# Patient Record
Sex: FEMALE | Race: ASIAN | NOT HISPANIC OR LATINO
[De-identification: names, ages, dates, MRNs, and addresses within clinical notes are randomized per-mention and may not be internally consistent; named-entity substitution may affect disease eponyms.]

---

## 2017-03-22 ENCOUNTER — APPOINTMENT (OUTPATIENT)
Dept: UROGYNECOLOGY | Facility: CLINIC | Age: 72
End: 2017-03-22

## 2017-06-21 ENCOUNTER — APPOINTMENT (OUTPATIENT)
Dept: UROGYNECOLOGY | Facility: CLINIC | Age: 72
End: 2017-06-21

## 2017-09-27 ENCOUNTER — APPOINTMENT (OUTPATIENT)
Dept: UROGYNECOLOGY | Facility: CLINIC | Age: 72
End: 2017-09-27
Payer: MEDICARE

## 2017-09-27 PROCEDURE — 99213 OFFICE O/P EST LOW 20 MIN: CPT

## 2017-10-24 ENCOUNTER — APPOINTMENT (OUTPATIENT)
Dept: RADIOLOGY | Facility: CLINIC | Age: 72
End: 2017-10-24
Payer: MEDICARE

## 2017-10-24 ENCOUNTER — OUTPATIENT (OUTPATIENT)
Dept: OUTPATIENT SERVICES | Facility: HOSPITAL | Age: 72
LOS: 1 days | End: 2017-10-24
Payer: MEDICARE

## 2017-10-24 DIAGNOSIS — Z00.8 ENCOUNTER FOR OTHER GENERAL EXAMINATION: ICD-10-CM

## 2017-10-24 PROCEDURE — 73562 X-RAY EXAM OF KNEE 3: CPT | Mod: 26,50

## 2017-10-24 PROCEDURE — 73562 X-RAY EXAM OF KNEE 3: CPT

## 2017-12-20 ENCOUNTER — APPOINTMENT (OUTPATIENT)
Dept: UROGYNECOLOGY | Facility: CLINIC | Age: 72
End: 2017-12-20
Payer: MEDICARE

## 2017-12-20 PROCEDURE — 99213 OFFICE O/P EST LOW 20 MIN: CPT

## 2018-03-01 ENCOUNTER — OUTPATIENT (OUTPATIENT)
Dept: OUTPATIENT SERVICES | Facility: HOSPITAL | Age: 73
LOS: 1 days | End: 2018-03-01
Payer: MEDICAID

## 2018-03-01 PROCEDURE — G9001: CPT

## 2018-03-07 DIAGNOSIS — R69 ILLNESS, UNSPECIFIED: ICD-10-CM

## 2018-03-21 ENCOUNTER — APPOINTMENT (OUTPATIENT)
Dept: UROGYNECOLOGY | Facility: CLINIC | Age: 73
End: 2018-03-21
Payer: MEDICARE

## 2018-03-21 PROCEDURE — 99213 OFFICE O/P EST LOW 20 MIN: CPT

## 2018-06-20 ENCOUNTER — APPOINTMENT (OUTPATIENT)
Dept: UROGYNECOLOGY | Facility: CLINIC | Age: 73
End: 2018-06-20
Payer: MEDICARE

## 2018-06-20 PROCEDURE — 99213 OFFICE O/P EST LOW 20 MIN: CPT

## 2018-09-17 ENCOUNTER — APPOINTMENT (OUTPATIENT)
Dept: UROGYNECOLOGY | Facility: CLINIC | Age: 73
End: 2018-09-17
Payer: MEDICARE

## 2018-09-17 PROCEDURE — 99213 OFFICE O/P EST LOW 20 MIN: CPT

## 2018-12-17 ENCOUNTER — APPOINTMENT (OUTPATIENT)
Dept: UROGYNECOLOGY | Facility: CLINIC | Age: 73
End: 2018-12-17
Payer: MEDICARE

## 2018-12-17 PROCEDURE — 99213 OFFICE O/P EST LOW 20 MIN: CPT

## 2018-12-17 PROCEDURE — A4562: CPT

## 2019-04-01 ENCOUNTER — APPOINTMENT (OUTPATIENT)
Dept: UROGYNECOLOGY | Facility: CLINIC | Age: 74
End: 2019-04-01
Payer: MEDICARE

## 2019-04-01 PROCEDURE — 99213 OFFICE O/P EST LOW 20 MIN: CPT

## 2019-04-01 NOTE — HISTORY OF PRESENT ILLNESS
[FreeTextEntry1] : Pt presents to office for f/u of prolapse.  Denies any issues with pessary.  Pessary size decreased at last visit.

## 2019-04-01 NOTE — PROCEDURE
[Good Fit] : fits well [Pessary Washed] : washed [H2O] : H2O [None] : no bleeding [Refit] : refit is not needed [Erosion] : no evidence of erosion [Erythema] : no erythema [Discharge] : no vaginal discharge [FreeTextEntry1] : GH  2 1/4 SS [FreeTextEntry8] : routine [facility]

## 2019-04-01 NOTE — DISCUSSION/SUMMARY
[FreeTextEntry1] : Pt doing well with pessary. She will RTO in 3 months or sooner if needed. Pt agrees to call office with any problems/concerns.

## 2019-07-03 ENCOUNTER — APPOINTMENT (OUTPATIENT)
Dept: UROGYNECOLOGY | Facility: CLINIC | Age: 74
End: 2019-07-03

## 2019-08-14 ENCOUNTER — APPOINTMENT (OUTPATIENT)
Dept: UROGYNECOLOGY | Facility: CLINIC | Age: 74
End: 2019-08-14
Payer: MEDICARE

## 2019-08-14 PROCEDURE — A4562: CPT

## 2019-08-14 PROCEDURE — 99214 OFFICE O/P EST MOD 30 MIN: CPT

## 2019-08-14 NOTE — PHYSICAL EXAM
[No Acute Distress] : in no acute distress [Well developed] : well developed [Good Hygeine] : demonstrates good hygeine [Well Nourished] : ~L well nourished [Oriented x3] : oriented to person, place, and time [Normal Memory] : ~T memory was ~L unimpaired [Normal Mood/Affect] : mood and affect are normal [Warm and Dry] : was warm and dry to touch [Normal Gait] : gait was normal [Labia Majora] : were normal [Labia Minora] : were normal [Normal] : was normal [Normal Appearance] : general appearance was normal [Atrophy] : atrophy [No Bleeding] : there was no active vaginal bleeding [Anxiety] : patient is not anxious [Tenderness] : ~T no ~M abdominal tenderness observed [Distended] : not distended

## 2019-08-14 NOTE — HISTORY OF PRESENT ILLNESS
[FreeTextEntry1] : Pt presents to office for fu of prolapse.  She reports that pessary fell out 1 week ago.  Smaller GH pessary was inserted 12/2018.  She denied any issues with pessary until last week.  \par \par Pt reports her  passed away 2 months ago and was not able to RTO last month for f/u.

## 2019-08-14 NOTE — PROCEDURE
[Pessary Inserted] : inserted [None] : no bleeding [Erosion] : no evidence of erosion [Erythema] : no erythema [Discharge] : no vaginal discharge [FreeTextEntry1] : refitted with RS #3 [de-identified] : GH 2 1/2 too big and not able to insert [FreeTextEntry8] : routine [facility]

## 2019-08-14 NOTE — DISCUSSION/SUMMARY
[FreeTextEntry1] : Pt reports comfort with ring with support pessary.  Advised of possibility that pessary can fall out at home.  pt will RTO in 2 weeks for f/u of prolapse.  She agrees to call office with any problems/concerns.

## 2019-08-28 ENCOUNTER — APPOINTMENT (OUTPATIENT)
Dept: UROGYNECOLOGY | Facility: CLINIC | Age: 74
End: 2019-08-28
Payer: MEDICARE

## 2019-08-28 PROCEDURE — 99214 OFFICE O/P EST MOD 30 MIN: CPT

## 2019-08-28 NOTE — PHYSICAL EXAM
[No Acute Distress] : in no acute distress [Well Nourished] : ~L well nourished [Well developed] : well developed [Oriented x3] : oriented to person, place, and time [Good Hygeine] : demonstrates good hygeine [Normal Memory] : ~T memory was ~L unimpaired [Normal Mood/Affect] : mood and affect are normal [Normal Gait] : gait was normal [Warm and Dry] : was warm and dry to touch [Labia Majora] : were normal [Labia Minora] : were normal [Normal] : was normal [Normal Appearance] : general appearance was normal [Atrophy] : atrophy [No Bleeding] : there was no active vaginal bleeding [Anxiety] : patient is not anxious [Tenderness] : ~T no ~M abdominal tenderness observed [Distended] : not distended

## 2019-08-28 NOTE — HISTORY OF PRESENT ILLNESS
[FreeTextEntry1] : Pt presents to office for f/u of prolapse. She was refitted with RS #3 at last visit.  She reports that pessary fell out 3 days later.  Prior to this she was wearing a GH pessary for several years, which fell out.

## 2019-08-28 NOTE — PROCEDURE
[Refit] : refit needed [Pessary Inserted] : inserted [None] : no bleeding [H2O] : H2O [Erosion] : no evidence of erosion [Erythema] : no erythema [FreeTextEntry1] : fitted with Cube #2 [de-identified] : tried RS #4-pt reported discomfort, too tight. she reports comfort with #2 cube [FreeTextEntry8] : routine [facility]

## 2019-08-28 NOTE — DISCUSSION/SUMMARY
[FreeTextEntry1] : Pt reports comfort with cube #2.  she will RTO in 2 weeks or sooner if needed. Pt agrees to call office with any problems/concerns.

## 2019-09-11 ENCOUNTER — APPOINTMENT (OUTPATIENT)
Dept: UROGYNECOLOGY | Facility: CLINIC | Age: 74
End: 2019-09-11
Payer: MEDICARE

## 2019-09-11 PROCEDURE — 99213 OFFICE O/P EST LOW 20 MIN: CPT

## 2019-09-11 NOTE — DISCUSSION/SUMMARY
[FreeTextEntry1] : Pt doing well with pessary. She will RTO in 2 months for f/u of prolapse. Agrees to call office with any problems/concerns.

## 2019-09-11 NOTE — PROCEDURE
[Good Fit] : fits well [H2O] : H2O [Pessary Washed] : washed [None] : no bleeding [Refit] : refit is not needed [Erosion] : no evidence of erosion [Discharge] : no vaginal discharge [Erythema] : no erythema [Infection] : no evidence of infection [FreeTextEntry1] : Cube #2 [FreeTextEntry8] : routine [facility]

## 2019-09-11 NOTE — PHYSICAL EXAM
[No Acute Distress] : in no acute distress [Well developed] : well developed [Well Nourished] : ~L well nourished [Oriented x3] : oriented to person, place, and time [Good Hygeine] : demonstrates good hygeine [Normal Memory] : ~T memory was ~L unimpaired [Normal Mood/Affect] : mood and affect are normal [Warm and Dry] : was warm and dry to touch [Normal Gait] : gait was normal [Labia Majora] : were normal [Labia Minora] : were normal [Normal] : was normal [Normal Appearance] : general appearance was normal [Atrophy] : atrophy [No Bleeding] : there was no active vaginal bleeding [Anxiety] : patient is not anxious [Tenderness] : ~T no ~M abdominal tenderness observed [Distended] : not distended

## 2019-09-11 NOTE — HISTORY OF PRESENT ILLNESS
[FreeTextEntry1] : Pt presents to office for f/u of prolapse. Fitted with cube pessary 2 weeks ago.  She reports good fit and support.

## 2019-11-13 ENCOUNTER — APPOINTMENT (OUTPATIENT)
Dept: UROGYNECOLOGY | Facility: CLINIC | Age: 74
End: 2019-11-13
Payer: MEDICARE

## 2019-11-13 PROCEDURE — 99213 OFFICE O/P EST LOW 20 MIN: CPT

## 2019-11-13 NOTE — HISTORY OF PRESENT ILLNESS
[FreeTextEntry1] : Pt presents to office for f/u of prolapse. denies any issues with pessary. reports occasional UUI, not bothersome.

## 2019-11-13 NOTE — PHYSICAL EXAM
[No Acute Distress] : in no acute distress [Well developed] : well developed [Well Nourished] : ~L well nourished [Good Hygeine] : demonstrates good hygeine [Oriented x3] : oriented to person, place, and time [Normal Memory] : ~T memory was ~L unimpaired [Normal Mood/Affect] : mood and affect are normal [Warm and Dry] : was warm and dry to touch [Normal Gait] : gait was normal [Labia Majora] : were normal [Labia Minora] : were normal [Normal Appearance] : general appearance was normal [Atrophy] : atrophy [Normal] : was normal [No Bleeding] : there was no active vaginal bleeding [Anxiety] : patient is not anxious [Tenderness] : ~T no ~M abdominal tenderness observed [Distended] : not distended

## 2019-11-13 NOTE — PROCEDURE
[Good Fit] : fits well [Pessary Washed] : washed [H2O] : H2O [None] : no bleeding [Refit] : refit is not needed [Erosion] : no evidence of erosion [Erythema] : no erythema [Infection] : no evidence of infection [Discharge] : no vaginal discharge [FreeTextEntry8] : routine [facility] [FreeTextEntry1] : Cube #2

## 2020-01-14 ENCOUNTER — APPOINTMENT (OUTPATIENT)
Dept: UROGYNECOLOGY | Facility: CLINIC | Age: 75
End: 2020-01-14
Payer: MEDICARE

## 2020-01-14 PROCEDURE — 99213 OFFICE O/P EST LOW 20 MIN: CPT

## 2020-01-15 NOTE — PROCEDURE
[Good Fit] : fits well [Pessary Washed] : washed [None] : no bleeding [H2O] : H2O [Refit] : refit is not needed [Erosion] : no evidence of erosion [Discharge] : no vaginal discharge [Erythema] : no erythema [Infection] : no evidence of infection [FreeTextEntry1] : Cube #2 [FreeTextEntry8] : routine [facility]

## 2020-03-17 ENCOUNTER — APPOINTMENT (OUTPATIENT)
Dept: UROGYNECOLOGY | Facility: CLINIC | Age: 75
End: 2020-03-17

## 2021-05-27 ENCOUNTER — APPOINTMENT (OUTPATIENT)
Dept: UROGYNECOLOGY | Facility: CLINIC | Age: 76
End: 2021-05-27
Payer: MEDICARE

## 2021-05-27 VITALS
HEART RATE: 70 BPM | BODY MASS INDEX: 29.3 KG/M2 | DIASTOLIC BLOOD PRESSURE: 84 MMHG | SYSTOLIC BLOOD PRESSURE: 123 MMHG | OXYGEN SATURATION: 98 % | HEIGHT: 63 IN | WEIGHT: 165.34 LBS | TEMPERATURE: 97.6 F

## 2021-05-27 PROCEDURE — 99213 OFFICE O/P EST LOW 20 MIN: CPT

## 2021-05-27 NOTE — HISTORY OF PRESENT ILLNESS
[FreeTextEntry1] : Vandana, 74y/o presents with family member for follow up on prolapse.  Pt haven't been back to the office since 1/14/2020.  Pt had a cube # 2 in the past and have been coming for maintenance up until Covid-19 restrictions.  Pt's pessary fell out after a few days after the last visit.  Denies any pain, discomfort, or vaginal bleeding.  She has been urinating and moving BM without problems.  Only complaint is the prolapse is protruding and she would like to have pessary inserted.

## 2021-05-27 NOTE — DISCUSSION/SUMMARY
[FreeTextEntry1] : Fitted with Cube # 3 today.\par She will RTO in 2-3 weeks for pessary check/prolapse. Agrees to call office with any problems/concerns.

## 2021-05-27 NOTE — PHYSICAL EXAM
[No Acute Distress] : in no acute distress [Well developed] : well developed [Well Nourished] : ~L well nourished [Good Hygeine] : demonstrates good hygeine [Oriented x3] : oriented to person, place, and time [Normal Memory] : ~T memory was ~L unimpaired [Normal Mood/Affect] : mood and affect are normal [Anxiety] : patient is not anxious [Tenderness] : ~T no ~M abdominal tenderness observed [Distended] : not distended [Warm and Dry] : was warm and dry to touch [Normal Gait] : gait was normal [Labia Minora] : were normal [Labia Majora] : were normal [Normal] : was normal [Normal Appearance] : general appearance was normal [Atrophy] : atrophy [No Bleeding] : there was no active vaginal bleeding [de-identified] : Prolapse protruding out of vagina

## 2021-05-27 NOTE — PROCEDURE
[Good Fit] : fits well [Refit] : refit is not needed [Erosion] : no evidence of erosion [Erythema] : no erythema [Discharge] : no vaginal discharge [Infection] : no evidence of infection [Pessary Inserted] : inserted [H2O] : H2O [None] : no bleeding [FreeTextEntry1] : Fitted with Cube # 3 [FreeTextEntry8] : routine [facility]

## 2021-06-17 ENCOUNTER — APPOINTMENT (OUTPATIENT)
Dept: UROGYNECOLOGY | Facility: CLINIC | Age: 76
End: 2021-06-17
Payer: MEDICARE

## 2021-06-17 PROCEDURE — 99213 OFFICE O/P EST LOW 20 MIN: CPT

## 2021-06-17 NOTE — HISTORY OF PRESENT ILLNESS
[FreeTextEntry1] : Vandana, 74y/o presents with family member for follow up on prolapse. Was fitted with Cube # 3 and feels pessary is very supportive.  Denies any pain, discomfort, or vaginal bleeding.  She has been urinating and moving BM without problems.

## 2021-06-17 NOTE — DISCUSSION/SUMMARY
[FreeTextEntry1] : Pelvic Prolapse;\par -Continue with Cube # 3.\par -Precaution and instructions were reviewed.\par \par Vaginal atrophy:\par -May use vagina lubrication 2-3x a week.\par \par She will RTO in 2 months for pessary check/prolapse. Agrees to call office with any problems/concerns.

## 2021-06-17 NOTE — PHYSICAL EXAM
[No Acute Distress] : in no acute distress [Well developed] : well developed [Well Nourished] : ~L well nourished [Good Hygeine] : demonstrates good hygeine [Oriented x3] : oriented to person, place, and time [Normal Memory] : ~T memory was ~L unimpaired [Normal Mood/Affect] : mood and affect are normal [Anxiety] : patient is not anxious [Tenderness] : ~T no ~M abdominal tenderness observed [Distended] : not distended [Warm and Dry] : was warm and dry to touch [Normal Gait] : gait was normal [Labia Majora] : were normal [Labia Minora] : were normal [Normal] : was normal [Normal Appearance] : general appearance was normal [Atrophy] : atrophy [No Bleeding] : there was no active vaginal bleeding [de-identified] : Prolapse protruding out of vagina

## 2021-08-19 ENCOUNTER — APPOINTMENT (OUTPATIENT)
Dept: UROGYNECOLOGY | Facility: CLINIC | Age: 76
End: 2021-08-19
Payer: MEDICARE

## 2021-08-19 VITALS — HEART RATE: 100 BPM | SYSTOLIC BLOOD PRESSURE: 135 MMHG | TEMPERATURE: 95.7 F | DIASTOLIC BLOOD PRESSURE: 70 MMHG

## 2021-08-19 DIAGNOSIS — N39.41 URGE INCONTINENCE: ICD-10-CM

## 2021-08-19 PROCEDURE — 99213 OFFICE O/P EST LOW 20 MIN: CPT

## 2021-08-19 NOTE — HISTORY OF PRESENT ILLNESS
[FreeTextEntry1] : Vandana, 74y/o presents with family member for follow up on prolapse. Supported with Cube # 3 and feels pessary is very supportive.  Denies any pain, discomfort, or vaginal bleeding.  She has been urinating and moving BM without problems.

## 2021-08-19 NOTE — PROCEDURE
[Good Fit] : fits well [Refit] : refit is not needed [Erosion] : no evidence of erosion [Erythema] : no erythema [Discharge] : no vaginal discharge [Infection] : no evidence of infection [Pessary Inserted] : inserted [Pessary Washed] : washed [H2O] : H2O [None] : no bleeding [Medication Review] : Medicaiton Review: Patient verbalizes understanding of risks and benefits [FreeTextEntry1] : Cube # 3 [FreeTextEntry3] : vaginal lubrication. [FreeTextEntry8] : routine [facility]

## 2021-08-19 NOTE — PHYSICAL EXAM
[No Acute Distress] : in no acute distress [Well developed] : well developed [Well Nourished] : ~L well nourished [Good Hygeine] : demonstrates good hygeine [Oriented x3] : oriented to person, place, and time [Normal Memory] : ~T memory was ~L unimpaired [Normal Mood/Affect] : mood and affect are normal [Anxiety] : patient is not anxious [Tenderness] : ~T no ~M abdominal tenderness observed [Distended] : not distended [Warm and Dry] : was warm and dry to touch [Normal Gait] : gait was normal [Vulvar Atrophy] : vulvar atrophy [Labia Majora] : were normal [Labia Minora] : were normal [Normal] : was normal [Normal Appearance] : general appearance was normal [Atrophy] : atrophy [Cystocele] : a cystocele [No Bleeding] : there was no active vaginal bleeding [de-identified] : Mild irritation along left vaginal wall rubbing from pessary.  No active bleeding.

## 2021-10-28 ENCOUNTER — APPOINTMENT (OUTPATIENT)
Dept: UROGYNECOLOGY | Facility: CLINIC | Age: 76
End: 2021-10-28
Payer: MEDICARE

## 2021-10-28 PROCEDURE — 99213 OFFICE O/P EST LOW 20 MIN: CPT

## 2021-10-28 NOTE — HISTORY OF PRESENT ILLNESS
[FreeTextEntry1] : Vandana, 76y/o presents with family member for follow up on prolapse. Supported with Cube # 3 and feels pessary is very supportive.  Denies any pain, discomfort, or vaginal bleeding.  She has been urinating and moving BM without problems.

## 2021-10-28 NOTE — PHYSICAL EXAM
[No Acute Distress] : in no acute distress [Well developed] : well developed [Well Nourished] : ~L well nourished [Good Hygeine] : demonstrates good hygeine [Oriented x3] : oriented to person, place, and time [Normal Memory] : ~T memory was ~L unimpaired [Normal Mood/Affect] : mood and affect are normal [Anxiety] : patient is not anxious [Tenderness] : ~T no ~M abdominal tenderness observed [Distended] : not distended [Warm and Dry] : was warm and dry to touch [Normal Gait] : gait was normal [Vulvar Atrophy] : vulvar atrophy [Labia Majora] : were normal [Labia Minora] : were normal [Normal] : was normal [Normal Appearance] : general appearance was normal [Atrophy] : atrophy [Cystocele] : a cystocele [No Bleeding] : there was no active vaginal bleeding [de-identified] : Mild irritation along left vaginal wall rubbing from pessary.  No active bleeding.

## 2022-02-15 ENCOUNTER — APPOINTMENT (OUTPATIENT)
Dept: UROGYNECOLOGY | Facility: CLINIC | Age: 77
End: 2022-02-15
Payer: MEDICARE

## 2022-02-15 PROCEDURE — 99213 OFFICE O/P EST LOW 20 MIN: CPT

## 2022-02-18 NOTE — PHYSICAL EXAM
[No Acute Distress] : in no acute distress [Well developed] : well developed [Well Nourished] : ~L well nourished [Good Hygeine] : demonstrates good hygeine [Oriented x3] : oriented to person, place, and time [Normal Memory] : ~T memory was ~L unimpaired [Normal Mood/Affect] : mood and affect are normal [Warm and Dry] : was warm and dry to touch [Vulvar Atrophy] : vulvar atrophy [Labia Majora] : were normal [Labia Minora] : were normal [Normal] : was normal [Normal Appearance] : general appearance was normal [Atrophy] : atrophy [Cystocele] : a cystocele [No Bleeding] : there was no active vaginal bleeding [Anxiety] : patient is not anxious [Tenderness] : ~T no ~M abdominal tenderness observed [Distended] : not distended [de-identified] : Gait slow but steady with rolling walker.

## 2022-02-18 NOTE — PROCEDURE
[Good Fit] : fits well [Pessary Inserted] : inserted [Pessary Washed] : washed [H2O] : H2O [None] : no bleeding [Medication Review] : Medicaiton Review: Patient verbalizes understanding of risks and benefits [Refit] : refit is not needed [Erosion] : no evidence of erosion [Erythema] : no erythema [Discharge] : no vaginal discharge [Infection] : no evidence of infection [FreeTextEntry1] : Cube # 3 [FreeTextEntry3] : vaginal lubrication. [FreeTextEntry8] : routine [facility]

## 2022-05-17 ENCOUNTER — APPOINTMENT (OUTPATIENT)
Dept: UROGYNECOLOGY | Facility: CLINIC | Age: 77
End: 2022-05-17
Payer: MEDICARE

## 2022-05-17 VITALS — TEMPERATURE: 96.8 F

## 2022-05-17 PROCEDURE — 99213 OFFICE O/P EST LOW 20 MIN: CPT

## 2022-05-17 NOTE — PHYSICAL EXAM
[No Acute Distress] : in no acute distress [Well developed] : well developed [Well Nourished] : ~L well nourished [Good Hygeine] : demonstrates good hygeine [Oriented x3] : oriented to person, place, and time [Normal Memory] : ~T memory was ~L unimpaired [Normal Mood/Affect] : mood and affect are normal [Anxiety] : patient is not anxious [Tenderness] : ~T no ~M abdominal tenderness observed [Distended] : not distended [Warm and Dry] : was warm and dry to touch [Vulvar Atrophy] : vulvar atrophy [Labia Majora] : were normal [Labia Minora] : were normal [Normal] : was normal [Normal Appearance] : general appearance was normal [Atrophy] : atrophy [Cystocele] : a cystocele [No Bleeding] : there was no active vaginal bleeding [de-identified] : Gait slow but steady with rolling walker.

## 2022-05-17 NOTE — DISCUSSION/SUMMARY
[FreeTextEntry1] : Pelvic Prolapse;\par -Continue with Cube # 3.\par -Precaution and instructions were reviewed.\par \par Vaginal atrophy:\par -May use vagina lubrication 2-3x a week.\par \par She will RTO in 2-3 months for pessary check/prolapse. Agrees to call office with any problems/concerns.

## 2022-08-17 ENCOUNTER — APPOINTMENT (OUTPATIENT)
Dept: UROGYNECOLOGY | Facility: CLINIC | Age: 77
End: 2022-08-17

## 2022-08-17 VITALS — SYSTOLIC BLOOD PRESSURE: 124 MMHG | TEMPERATURE: 96.8 F | HEART RATE: 71 BPM | DIASTOLIC BLOOD PRESSURE: 76 MMHG

## 2022-08-17 PROCEDURE — 99213 OFFICE O/P EST LOW 20 MIN: CPT

## 2022-11-10 ENCOUNTER — APPOINTMENT (OUTPATIENT)
Dept: UROGYNECOLOGY | Facility: CLINIC | Age: 77
End: 2022-11-10

## 2022-11-10 PROCEDURE — 99213 OFFICE O/P EST LOW 20 MIN: CPT

## 2023-02-09 ENCOUNTER — APPOINTMENT (OUTPATIENT)
Dept: UROGYNECOLOGY | Facility: CLINIC | Age: 78
End: 2023-02-09
Payer: MEDICARE

## 2023-02-09 PROCEDURE — 99213 OFFICE O/P EST LOW 20 MIN: CPT

## 2023-05-03 NOTE — PROCEDURE
No/Unable to asses [Good Fit] : fits well [Refit] : refit is not needed [Erosion] : no evidence of erosion [Erythema] : no erythema [Discharge] : no vaginal discharge [Infection] : no evidence of infection [Pessary Inserted] : inserted [Pessary Washed] : washed [H2O] : H2O [None] : no bleeding [Medication Review] : Medicaiton Review: Patient verbalizes understanding of risks and benefits [FreeTextEntry1] : Cube # 3 [FreeTextEntry3] : vaginal lubrication. [FreeTextEntry8] : routine [facility]

## 2023-05-11 ENCOUNTER — APPOINTMENT (OUTPATIENT)
Dept: UROGYNECOLOGY | Facility: CLINIC | Age: 78
End: 2023-05-11
Payer: MEDICARE

## 2023-05-11 VITALS — SYSTOLIC BLOOD PRESSURE: 131 MMHG | HEART RATE: 71 BPM | DIASTOLIC BLOOD PRESSURE: 69 MMHG

## 2023-05-11 PROCEDURE — 99213 OFFICE O/P EST LOW 20 MIN: CPT

## 2023-08-03 ENCOUNTER — APPOINTMENT (OUTPATIENT)
Dept: UROGYNECOLOGY | Facility: CLINIC | Age: 78
End: 2023-08-03
Payer: MEDICARE

## 2023-08-03 DIAGNOSIS — N81.2 INCOMPLETE UTEROVAGINAL PROLAPSE: ICD-10-CM

## 2023-08-03 DIAGNOSIS — N81.6 RECTOCELE: ICD-10-CM

## 2023-08-03 PROCEDURE — 99213 OFFICE O/P EST LOW 20 MIN: CPT

## 2023-08-03 NOTE — DISCUSSION/SUMMARY
[FreeTextEntry1] : #Pelvic Prolapse: -Continue with Cube # 3. -Precaution and instructions were reviewed. -She will RTO in 2 months for follow-up.  All questions answered to the patient's satisfaction.  She expressed understanding. She knows to contact the office with any questions or concerns.

## 2023-08-03 NOTE — HISTORY OF PRESENT ILLNESS
[FreeTextEntry1] : Vandana is a 76 y/o with known POP supported by a cube #3.  She is happy with the support of the pessary.  Denies any pain, discomfort, or vaginal bleeding.  She has been urinating and moving BM without problems.

## 2023-08-03 NOTE — PROCEDURE
[Good Fit] : fits well [Refit] : refit is not needed [Erosion] : no evidence of erosion [Erythema] : erythema noted [Discharge] : there is vaginal discharge [Infection] : no evidence of infection [Pessary Inserted] : inserted [Pessary Washed] : washed [None] : no bleeding [Medication Review] : Medicaiton Review: Patient verbalizes understanding of risks and benefits [FreeTextEntry1] : Cube # 3 [FreeTextEntry8] : routine [facility]

## 2023-08-03 NOTE — PHYSICAL EXAM
[No Acute Distress] : in no acute distress [Well developed] : well developed [Well Nourished] : ~L well nourished [Good Hygeine] : demonstrates good hygeine [Normal Mood/Affect] : mood and affect are normal [Anxiety] : patient is not anxious [Warm and Dry] : was warm and dry to touch [Normal Gait] : gait was abnormal [Vulvar Atrophy] : vulvar atrophy [Labia Majora] : were normal [Labia Minora] : were normal [Atrophy] : atrophy [Erythematous] : erythema [Discharge] : a  ~M vaginal discharge was present [Scant] : scant [Yady] : yellow [Thin] : thin [No Bleeding] : there was no active vaginal bleeding [de-identified] : rolling walker

## 2023-10-26 ENCOUNTER — APPOINTMENT (OUTPATIENT)
Dept: UROGYNECOLOGY | Facility: CLINIC | Age: 78
End: 2023-10-26
Payer: MEDICARE

## 2023-10-26 VITALS
WEIGHT: 140 LBS | HEART RATE: 73 BPM | DIASTOLIC BLOOD PRESSURE: 65 MMHG | BODY MASS INDEX: 27.48 KG/M2 | SYSTOLIC BLOOD PRESSURE: 130 MMHG | HEIGHT: 60 IN

## 2023-10-26 DIAGNOSIS — N89.8 OTHER SPECIFIED NONINFLAMMATORY DISORDERS OF VAGINA: ICD-10-CM

## 2023-10-26 DIAGNOSIS — N95.2 POSTMENOPAUSAL ATROPHIC VAGINITIS: ICD-10-CM

## 2023-10-26 PROCEDURE — 99213 OFFICE O/P EST LOW 20 MIN: CPT

## 2024-02-22 ENCOUNTER — APPOINTMENT (OUTPATIENT)
Dept: UROGYNECOLOGY | Facility: CLINIC | Age: 79
End: 2024-02-22

## 2024-03-19 ENCOUNTER — APPOINTMENT (OUTPATIENT)
Dept: UROGYNECOLOGY | Facility: CLINIC | Age: 79
End: 2024-03-19
Payer: MEDICARE

## 2024-03-19 DIAGNOSIS — N81.11 CYSTOCELE, MIDLINE: ICD-10-CM

## 2024-03-19 PROCEDURE — 99213 OFFICE O/P EST LOW 20 MIN: CPT

## 2024-03-19 NOTE — PHYSICAL EXAM
[No Acute Distress] : in no acute distress [Well developed] : well developed [Well Nourished] : ~L well nourished [Good Hygeine] : demonstrates good hygeine [Normal Mood/Affect] : mood and affect are normal [Warm and Dry] : was warm and dry to touch [Vulvar Atrophy] : vulvar atrophy [Labia Majora] : were normal [Atrophy] : atrophy [Labia Minora] : were normal [Erythematous] : erythema [Discharge] : a  ~M vaginal discharge was present [Scant] : scant [Yady] : yellow [Thin] : thin [No Bleeding] : there was no active vaginal bleeding [Anxiety] : patient is not anxious [Normal Gait] : gait was abnormal [de-identified] : rolling walker

## 2024-03-19 NOTE — PROCEDURE
[Good Fit] : fits well [Erythema] : erythema noted [Discharge] : there is vaginal discharge [Pessary Inserted] : inserted [Pessary Washed] : washed [Medication Review] : Medicaiton Review: Patient verbalizes understanding of risks and benefits [None] : no bleeding [Refit] : refit is not needed [Erosion] : no evidence of erosion [Infection] : no evidence of infection [FreeTextEntry1] : Cube # 3 [FreeTextEntry8] : routine [facility]

## 2024-03-19 NOTE — DISCUSSION/SUMMARY
[FreeTextEntry1] : Pelvic Prolapse: -Continue with Cube # 3. -Precaution and instructions were reviewed. -She was advised of the importance of appointment adherence   -She will RTO in 2 months for follow-up. If pt have any problems/concern to call office.  PT aware and agrees.

## 2024-03-19 NOTE — HISTORY OF PRESENT ILLNESS
[FreeTextEntry1] : Vandana is an 77 y/o female who presents to the office for follow up on pelvic prolapse, supported with cube #3. She had to cancel two appointments due to illness and was therefore last seen 10/26/23.  She is happy with the support of the pessary.  Denies any pain, discomfort, or vaginal bleeding.  She has been urinating and moving BM without problems.

## 2024-06-18 ENCOUNTER — APPOINTMENT (OUTPATIENT)
Dept: UROGYNECOLOGY | Facility: CLINIC | Age: 79
End: 2024-06-18

## 2024-07-29 ENCOUNTER — APPOINTMENT (OUTPATIENT)
Dept: UROGYNECOLOGY | Facility: CLINIC | Age: 79
End: 2024-07-29
Payer: MEDICARE

## 2024-07-29 DIAGNOSIS — N81.11 CYSTOCELE, MIDLINE: ICD-10-CM

## 2024-07-29 DIAGNOSIS — N81.6 RECTOCELE: ICD-10-CM

## 2024-07-29 DIAGNOSIS — N81.2 INCOMPLETE UTEROVAGINAL PROLAPSE: ICD-10-CM

## 2024-07-29 PROCEDURE — G2211 COMPLEX E/M VISIT ADD ON: CPT

## 2024-07-29 PROCEDURE — 99459 PELVIC EXAMINATION: CPT

## 2024-07-29 PROCEDURE — 99213 OFFICE O/P EST LOW 20 MIN: CPT

## 2024-07-29 NOTE — PHYSICAL EXAM
[Chaperone Present] : A chaperone was present in the examining room during all aspects of the physical examination [[id number]] : A chaperone was present during the pelvic exam. [No Acute Distress] : in no acute distress [Well developed] : well developed [Well Nourished] : ~L well nourished [Good Hygeine] : demonstrates good hygeine [Normal Mood/Affect] : mood and affect are normal [Warm and Dry] : was warm and dry to touch [Vulvar Atrophy] : vulvar atrophy [Labia Majora] : were normal [Labia Minora] : were normal [Atrophy] : atrophy [Erythematous] : erythema [Discharge] : a  ~M vaginal discharge was present [Scant] : scant [Yady] : yellow [Thin] : thin [No Bleeding] : there was no active vaginal bleeding [FreeTextEntry2] : [facility] [place] [Anxiety] : patient is not anxious [Normal Gait] : gait was abnormal [de-identified] : rolling walker

## 2024-07-29 NOTE — HISTORY OF PRESENT ILLNESS
[FreeTextEntry1] : Vandana is an 79 y/o female who presents to the office for follow up on pelvic prolapse, supported with cube #3. She was last seen on 3/19/24. She is happy with the support of the pessary.  Denies any pain, discomfort, or vaginal bleeding.  She has been urinating and moving BM without problems.

## 2024-07-29 NOTE — PROCEDURE
[Good Fit] : fits well [Erythema] : erythema noted [Discharge] : there is vaginal discharge [Pessary Inserted] : inserted [Pessary Washed] : washed [None] : no bleeding [Refit] : refit is not needed [Erosion] : no evidence of erosion [Infection] : no evidence of infection [FreeTextEntry1] : Cube # 3 [de-identified] : posterior vaginal vault  [FreeTextEntry8] : routine [facility]

## 2024-08-15 ENCOUNTER — APPOINTMENT (OUTPATIENT)
Dept: UROGYNECOLOGY | Facility: CLINIC | Age: 79
End: 2024-08-15
Payer: MEDICARE

## 2024-08-15 DIAGNOSIS — R35.0 FREQUENCY OF MICTURITION: ICD-10-CM

## 2024-08-15 PROCEDURE — G2211 COMPLEX E/M VISIT ADD ON: CPT

## 2024-08-15 PROCEDURE — 99459 PELVIC EXAMINATION: CPT

## 2024-08-15 PROCEDURE — 99213 OFFICE O/P EST LOW 20 MIN: CPT

## 2024-08-17 NOTE — PHYSICAL EXAM
[No Acute Distress] : in no acute distress [Well developed] : well developed [Well Nourished] : ~L well nourished [Good Hygeine] : demonstrates good hygeine [Oriented x3] : oriented to person, place, and time [Normal Memory] : ~T memory was ~L unimpaired [Normal Mood/Affect] : mood and affect are normal [Warm and Dry] : was warm and dry to touch [Normal Gait] : gait was normal [Labia Minora] : were normal [Labia Majora] : were normal [Normal] : was normal [Atrophy] : atrophy [Erythematous] : erythema [Uterine Prolapse] : uterine prolapse [No Bleeding] : there was no active vaginal bleeding [Chaperone Present] : A chaperone was present in the examining room during all aspects of the physical examination [[id number]] : A chaperone was present during the pelvic exam. [FreeTextEntry2] : [facility] [place] [Anxiety] : patient is not anxious [FreeTextEntry4] : Small area of erythema at bilateral posterior vaginal vault

## 2024-08-17 NOTE — PROCEDURE
[Good Fit] : fits well [Erythema] : erythema noted [Pessary Inserted] : inserted [Pessary Washed] : washed [None] : no bleeding [Refit] : refit is not needed [Erosion] : no evidence of erosion [Discharge] : no vaginal discharge [Infection] : no evidence of infection [FreeTextEntry1] : Cube #3  [de-identified] : Mildly at bilateral posterior vaginal vault  [FreeTextEntry4] : Discussed the importance of avoiding constipation; eat fiber rich diet and hydrate well.  [FreeTextEntry8] : Routine perineal care reviewed

## 2024-08-17 NOTE — PROCEDURE
[Good Fit] : fits well [Erythema] : erythema noted [Pessary Inserted] : inserted [Pessary Washed] : washed [None] : no bleeding [Refit] : refit is not needed [Erosion] : no evidence of erosion [Discharge] : no vaginal discharge [Infection] : no evidence of infection [FreeTextEntry1] : Cube #3  [de-identified] : Mildly at bilateral posterior vaginal vault  [FreeTextEntry4] : Discussed the importance of avoiding constipation; eat fiber rich diet and hydrate well.  [FreeTextEntry8] : Routine perineal care reviewed

## 2024-08-17 NOTE — PHYSICAL EXAM
[No Acute Distress] : in no acute distress [Well developed] : well developed [Well Nourished] : ~L well nourished [Good Hygeine] : demonstrates good hygeine [Oriented x3] : oriented to person, place, and time [Normal Memory] : ~T memory was ~L unimpaired [Normal Mood/Affect] : mood and affect are normal [Warm and Dry] : was warm and dry to touch [Normal Gait] : gait was normal [Labia Majora] : were normal [Labia Minora] : were normal [Normal] : was normal [Atrophy] : atrophy [Erythematous] : erythema [Uterine Prolapse] : uterine prolapse [No Bleeding] : there was no active vaginal bleeding [Chaperone Present] : A chaperone was present in the examining room during all aspects of the physical examination [[id number]] : A chaperone was present during the pelvic exam. [FreeTextEntry2] : [facility]LEON [place] [Anxiety] : patient is not anxious [FreeTextEntry4] : Small area of erythema at bilateral posterior vaginal vault

## 2024-08-17 NOTE — HISTORY OF PRESENT ILLNESS
[FreeTextEntry1] : Vandana is a 79yo with hx of POP supported with Cube #3.  She was last seen on 07/29/24 and has been happy with support with the pessary but the pessary fell out yesterday after having a bowel movement.  Patient's aide endorses that this is the first time this happened. Pt had some spotting episode yesterday after the pessary fell out but it stopped and today pt reports no spotting.  She denies any vaginal pain or discomfort.  She is voiding and moving her bowels without issues.

## 2024-08-17 NOTE — DISCUSSION/SUMMARY
[FreeTextEntry1] : #POP - Continue with Cube #3.  Discussed with patient that if the pessary keeps falling out, we may have to increase the size.  - Discussed with patient that the spotting is most likely due to the trauma of pessary falling out and irritation from pessary as evidenced by area of erythema at bilateral posterior vaginal vault.   - Routine precautions reviewed. - Discussed the importance of avoiding constipation and bearing down.   RTO in 2 months or earlier as needed. All questions answered to pt satisfaction.  Pt can call the office with any additional questions or concerns; pt verbalized understanding.

## 2024-08-22 ENCOUNTER — APPOINTMENT (OUTPATIENT)
Dept: UROGYNECOLOGY | Facility: CLINIC | Age: 79
End: 2024-08-22
Payer: MEDICARE

## 2024-08-22 DIAGNOSIS — N95.2 POSTMENOPAUSAL ATROPHIC VAGINITIS: ICD-10-CM

## 2024-08-22 DIAGNOSIS — N81.6 RECTOCELE: ICD-10-CM

## 2024-08-22 DIAGNOSIS — N81.2 INCOMPLETE UTEROVAGINAL PROLAPSE: ICD-10-CM

## 2024-08-22 PROCEDURE — 99213 OFFICE O/P EST LOW 20 MIN: CPT

## 2024-08-22 PROCEDURE — G2211 COMPLEX E/M VISIT ADD ON: CPT

## 2024-08-22 PROCEDURE — A4562: CPT

## 2024-08-23 NOTE — DISCUSSION/SUMMARY
[FreeTextEntry1] : #POP - Refit with Cube #4.  Pessary felt comfortable and pt was able to ambulate and Valsalva without issues.  - Routine precautions reviewed. - Discussed the importance of avoiding constipation and bearing down.   RTO in 2 weeks or earlier as needed. All questions answered to pt satisfaction.  Pt can call the office with any additional questions or concerns; pt verbalized understanding.

## 2024-08-23 NOTE — PHYSICAL EXAM
[No Acute Distress] : in no acute distress [Well developed] : well developed [Well Nourished] : ~L well nourished [Good Hygeine] : demonstrates good hygeine [Oriented x3] : oriented to person, place, and time [Normal Memory] : ~T memory was ~L unimpaired [Normal Mood/Affect] : mood and affect are normal [Warm and Dry] : was warm and dry to touch [Normal Gait] : gait was normal [Labia Majora] : were normal [Labia Minora] : were normal [Normal] : was normal [Atrophy] : atrophy [Uterine Prolapse] : uterine prolapse [Discharge] : a  ~M vaginal discharge was present [Scant] : scant [White] : white [Thin] : thin [No Bleeding] : there was no active vaginal bleeding [Anxiety] : patient is not anxious

## 2024-08-23 NOTE — PROCEDURE
[Refit] : refit needed [Discharge] : there is vaginal discharge [Pessary Inserted] : inserted [None] : no bleeding [Good Fit] : fit is not good [Erosion] : no evidence of erosion [Erythema] : no erythema [Infection] : no evidence of infection [FreeTextEntry1] : Cube #3  [de-identified] : Cube #4 [FreeTextEntry4] : Discussed the importance of avoiding constipation; eat fiber rich diet and hydrate well.  [FreeTextEntry8] : Routine perineal care reviewed

## 2024-08-23 NOTE — PROCEDURE
[Refit] : refit needed [Discharge] : there is vaginal discharge [Pessary Inserted] : inserted [None] : no bleeding [Good Fit] : fit is not good [Erosion] : no evidence of erosion [Erythema] : no erythema [Infection] : no evidence of infection [FreeTextEntry1] : Cube #3  [de-identified] : Cube #4 [FreeTextEntry4] : Discussed the importance of avoiding constipation; eat fiber rich diet and hydrate well.  [FreeTextEntry8] : Routine perineal care reviewed

## 2024-08-23 NOTE — HISTORY OF PRESENT ILLNESS
[FreeTextEntry1] : Vandana is a 77yo with hx of POP supported with Cube #3.  Pt was happy with support from the pessary but the pessary had fallen out two times in the last month with bowel movement.  She denies any vaginal pain or discomfort.  Pt denies any vaginal spotting or bleeding.  She is voiding and moving her bowels without issues.

## 2024-08-23 NOTE — HISTORY OF PRESENT ILLNESS
[FreeTextEntry1] : Vandana is a 79yo with hx of POP supported with Cube #3.  Pt was happy with support from the pessary but the pessary had fallen out two times in the last month with bowel movement.  She denies any vaginal pain or discomfort.  Pt denies any vaginal spotting or bleeding.  She is voiding and moving her bowels without issues.

## 2024-08-26 ENCOUNTER — APPOINTMENT (OUTPATIENT)
Dept: UROGYNECOLOGY | Facility: CLINIC | Age: 79
End: 2024-08-26
Payer: MEDICARE

## 2024-08-26 DIAGNOSIS — N81.11 CYSTOCELE, MIDLINE: ICD-10-CM

## 2024-08-26 PROCEDURE — 99213 OFFICE O/P EST LOW 20 MIN: CPT

## 2024-08-26 NOTE — HISTORY OF PRESENT ILLNESS
[FreeTextEntry1] : Vandana is a 77yo with hx of POP previously supported with Cube #3 which was upsized to a cube #4 on last visit.  She reports shortly after the visit, the pessary fell out.  She denies any vaginal pain or discomfort.  Pt denies any vaginal spotting or bleeding.  She is voiding and moving her bowels without issues.

## 2024-08-26 NOTE — DISCUSSION/SUMMARY
[FreeTextEntry1] : #POP - Refit with Cube #5.  Pessary felt comfortable and pt was able to ambulate and Valsalva without issues.  - Routine precautions reviewed. - Discussed the importance of avoiding constipation and bearing down.  -If Cube pessary fails, may try a Donut -She was additionally advised of surgical options if a pessary if no longer suitable for pessary   RTO as scheduled or earlier as needed. All questions answered to pt satisfaction.  Pt can call the office with any additional questions or concerns; pt verbalized understanding.

## 2024-08-26 NOTE — PROCEDURE
[Refit] : refit needed [Discharge] : there is vaginal discharge [Pessary Inserted] : inserted [None] : no bleeding [Good Fit] : fit is not good [Erosion] : no evidence of erosion [Erythema] : no erythema [Infection] : no evidence of infection [FreeTextEntry1] : Cube #4 [de-identified] : Cube #5 [FreeTextEntry4] : Discussed the importance of avoiding constipation; eat fiber rich diet and hydrate well.  [FreeTextEntry8] : Routine perineal care reviewed

## 2024-09-11 ENCOUNTER — APPOINTMENT (OUTPATIENT)
Dept: UROGYNECOLOGY | Facility: CLINIC | Age: 79
End: 2024-09-11
Payer: MEDICARE

## 2024-09-11 VITALS
HEIGHT: 60 IN | WEIGHT: 113 LBS | HEART RATE: 70 BPM | DIASTOLIC BLOOD PRESSURE: 70 MMHG | SYSTOLIC BLOOD PRESSURE: 119 MMHG | BODY MASS INDEX: 22.19 KG/M2

## 2024-09-11 DIAGNOSIS — N81.6 RECTOCELE: ICD-10-CM

## 2024-09-11 DIAGNOSIS — N81.2 INCOMPLETE UTEROVAGINAL PROLAPSE: ICD-10-CM

## 2024-09-11 PROCEDURE — G2211 COMPLEX E/M VISIT ADD ON: CPT

## 2024-09-11 PROCEDURE — 99459 PELVIC EXAMINATION: CPT

## 2024-09-11 PROCEDURE — 99213 OFFICE O/P EST LOW 20 MIN: CPT

## 2024-09-11 NOTE — DISCUSSION/SUMMARY
[FreeTextEntry1] : #POP: - Tried different pessaries and unable to find a pessary that fit. Patient understanding of situation. She will make appointment with Dr. Hicks to review her surgical options.  - Discussed importance of fully emptying bladder. Voiding assist techniques reviewed (leaning assist, double voiding, crede).  - Routine precautions reviewed.  RTO at next available appointment to review surgical options. All questions answered to pt satisfaction.  Pt can call the office with any additional questions or concerns; pt verbalized understanding.

## 2024-09-11 NOTE — PHYSICAL EXAM
[No Acute Distress] : in no acute distress [Well developed] : well developed [Well Nourished] : ~L well nourished [Good Hygeine] : demonstrates good hygeine [Oriented x3] : oriented to person, place, and time [Normal Memory] : ~T memory was ~L unimpaired [Normal Mood/Affect] : mood and affect are normal [Warm and Dry] : was warm and dry to touch [Labia Majora] : were normal [Labia Minora] : were normal [Normal] : was normal [Atrophy] : atrophy [Uterine Prolapse] : uterine prolapse [Scant] : scant [White] : white [Thin] : thin [No Bleeding] : there was no active vaginal bleeding [Chaperone Present] : A chaperone was present in the examining room during all aspects of the physical examination [11743] : A chaperone was present during the pelvic exam. [Vulvar Atrophy] : vulvar atrophy [Dry Mucosa] : dry mucosa [FreeTextEntry2] : ABDULAZIZ Cordon [Anxiety] : patient is not anxious [Normal Gait] : gait was abnormal [de-identified] : uses a rolling walker

## 2024-09-11 NOTE — HISTORY OF PRESENT ILLNESS
[FreeTextEntry1] : Vandana is an 78yo with hx of POP who was refit with a Cube #5 on her last visit (8/26/24). She reports that the pessary fell out later that day. She denies vaginal bleeding. She reports a yellow discharge after the pessary fell out, but it has since resolved. She denies any vaginal pain or discomfort. She is voiding and moving her bowels without issues.

## 2024-09-11 NOTE — PROCEDURE
[None] : no bleeding [Pessary Out] : removed [Good Fit] : fit is not good [Refit] : refit is not needed [Erosion] : no evidence of erosion [Erythema] : no erythema [Discharge] : no vaginal discharge [Infection] : no evidence of infection [FreeTextEntry8] : Routine perineal care reviewed

## 2024-10-07 ENCOUNTER — APPOINTMENT (OUTPATIENT)
Dept: UROGYNECOLOGY | Facility: CLINIC | Age: 79
End: 2024-10-07
Payer: MEDICARE

## 2024-10-07 VITALS
DIASTOLIC BLOOD PRESSURE: 73 MMHG | WEIGHT: 135 LBS | BODY MASS INDEX: 24.84 KG/M2 | SYSTOLIC BLOOD PRESSURE: 122 MMHG | HEART RATE: 69 BPM | HEIGHT: 62 IN

## 2024-10-07 DIAGNOSIS — N81.3 COMPLETE UTEROVAGINAL PROLAPSE: ICD-10-CM

## 2024-10-07 DIAGNOSIS — R82.90 UNSPECIFIED ABNORMAL FINDINGS IN URINE: ICD-10-CM

## 2024-10-07 DIAGNOSIS — N89.8 OTHER SPECIFIED NONINFLAMMATORY DISORDERS OF VAGINA: ICD-10-CM

## 2024-10-07 DIAGNOSIS — N95.0 POSTMENOPAUSAL BLEEDING: ICD-10-CM

## 2024-10-07 LAB
BILIRUB UR QL STRIP: NORMAL
CLARITY UR: CLEAR
COLLECTION METHOD: NORMAL
GLUCOSE UR-MCNC: NORMAL
HCG UR QL: 0.2 EU/DL
HGB UR QL STRIP.AUTO: NORMAL
KETONES UR-MCNC: NORMAL
LEUKOCYTE ESTERASE UR QL STRIP: NORMAL
NITRITE UR QL STRIP: NORMAL
PH UR STRIP: 6.5
PROT UR STRIP-MCNC: NORMAL
SP GR UR STRIP: 1.01

## 2024-10-07 PROCEDURE — 99215 OFFICE O/P EST HI 40 MIN: CPT | Mod: 25

## 2024-10-07 PROCEDURE — 99459 PELVIC EXAMINATION: CPT

## 2024-10-07 PROCEDURE — 81003 URINALYSIS AUTO W/O SCOPE: CPT | Mod: QW

## 2024-10-07 PROCEDURE — 51701 INSERT BLADDER CATHETER: CPT

## 2024-10-08 ENCOUNTER — NON-APPOINTMENT (OUTPATIENT)
Age: 79
End: 2024-10-08

## 2024-10-08 LAB
CANDIDA VAG CYTO: NOT DETECTED
G VAGINALIS+PREV SP MTYP VAG QL MICRO: NOT DETECTED
T VAGINALIS VAG QL WET PREP: NOT DETECTED

## 2024-10-14 LAB
APPEARANCE: CLEAR
BACTERIA: NEGATIVE /HPF
BILIRUBIN URINE: NEGATIVE
BLOOD URINE: NEGATIVE
CAST: 2 /LPF
COLOR: YELLOW
EPITHELIAL CELLS: 1 /HPF
GLUCOSE QUALITATIVE U: NEGATIVE MG/DL
HYALINE CASTS: PRESENT
KETONES URINE: NEGATIVE MG/DL
LEUKOCYTE ESTERASE URINE: ABNORMAL
MICROSCOPIC-UA: NORMAL
NITRITE URINE: NEGATIVE
PH URINE: 6.5
PROTEIN URINE: NORMAL MG/DL
RED BLOOD CELLS URINE: 2 /HPF
REVIEW: NORMAL
SPECIFIC GRAVITY URINE: 1.02
UROBILINOGEN URINE: 0.2 MG/DL
WHITE BLOOD CELLS URINE: 2 /HPF

## 2024-10-15 ENCOUNTER — APPOINTMENT (OUTPATIENT)
Dept: ULTRASOUND IMAGING | Facility: CLINIC | Age: 79
End: 2024-10-15

## 2024-10-28 ENCOUNTER — OUTPATIENT (OUTPATIENT)
Dept: OUTPATIENT SERVICES | Facility: HOSPITAL | Age: 79
LOS: 1 days | End: 2024-10-28
Payer: MEDICARE

## 2024-10-28 ENCOUNTER — APPOINTMENT (OUTPATIENT)
Dept: UROGYNECOLOGY | Facility: CLINIC | Age: 79
End: 2024-10-28
Payer: MEDICARE

## 2024-10-28 VITALS
BODY MASS INDEX: 24.84 KG/M2 | HEIGHT: 62 IN | SYSTOLIC BLOOD PRESSURE: 118 MMHG | DIASTOLIC BLOOD PRESSURE: 74 MMHG | WEIGHT: 135 LBS | HEART RATE: 71 BPM

## 2024-10-28 DIAGNOSIS — N95.0 POSTMENOPAUSAL BLEEDING: ICD-10-CM

## 2024-10-28 DIAGNOSIS — Z01.818 ENCOUNTER FOR OTHER PREPROCEDURAL EXAMINATION: ICD-10-CM

## 2024-10-28 DIAGNOSIS — N81.3 COMPLETE UTEROVAGINAL PROLAPSE: ICD-10-CM

## 2024-10-28 PROCEDURE — 58100 BIOPSY OF UTERUS LINING: CPT

## 2024-10-28 RX ORDER — ESTRADIOL 0.1 MG/G
0.1 CREAM VAGINAL
Qty: 1 | Refills: 3 | Status: ACTIVE | COMMUNITY
Start: 2024-10-28 | End: 1900-01-01

## 2024-10-29 ENCOUNTER — APPOINTMENT (OUTPATIENT)
Dept: UROGYNECOLOGY | Facility: CLINIC | Age: 79
End: 2024-10-29

## 2024-10-31 ENCOUNTER — APPOINTMENT (OUTPATIENT)
Dept: ULTRASOUND IMAGING | Facility: CLINIC | Age: 79
End: 2024-10-31
Payer: MEDICARE

## 2024-10-31 ENCOUNTER — OUTPATIENT (OUTPATIENT)
Dept: OUTPATIENT SERVICES | Facility: HOSPITAL | Age: 79
LOS: 1 days | End: 2024-10-31
Payer: MEDICARE

## 2024-10-31 DIAGNOSIS — N81.3 COMPLETE UTEROVAGINAL PROLAPSE: ICD-10-CM

## 2024-10-31 DIAGNOSIS — N95.0 POSTMENOPAUSAL BLEEDING: ICD-10-CM

## 2024-10-31 PROCEDURE — 76856 US EXAM PELVIC COMPLETE: CPT | Mod: 26

## 2024-10-31 PROCEDURE — 76830 TRANSVAGINAL US NON-OB: CPT | Mod: 26

## 2024-11-12 ENCOUNTER — OUTPATIENT (OUTPATIENT)
Dept: OUTPATIENT SERVICES | Facility: HOSPITAL | Age: 79
LOS: 1 days | End: 2024-11-12
Payer: MEDICARE

## 2024-11-12 VITALS
DIASTOLIC BLOOD PRESSURE: 76 MMHG | SYSTOLIC BLOOD PRESSURE: 117 MMHG | TEMPERATURE: 98 F | HEIGHT: 61 IN | HEART RATE: 69 BPM | OXYGEN SATURATION: 100 % | WEIGHT: 143.08 LBS | RESPIRATION RATE: 16 BRPM

## 2024-11-12 DIAGNOSIS — N81.3 COMPLETE UTEROVAGINAL PROLAPSE: ICD-10-CM

## 2024-11-12 DIAGNOSIS — E11.9 TYPE 2 DIABETES MELLITUS WITHOUT COMPLICATIONS: ICD-10-CM

## 2024-11-12 DIAGNOSIS — Z98.890 OTHER SPECIFIED POSTPROCEDURAL STATES: Chronic | ICD-10-CM

## 2024-11-12 DIAGNOSIS — Z29.9 ENCOUNTER FOR PROPHYLACTIC MEASURES, UNSPECIFIED: ICD-10-CM

## 2024-11-12 DIAGNOSIS — N81.10 CYSTOCELE, UNSPECIFIED: ICD-10-CM

## 2024-11-12 LAB
ANION GAP SERPL CALC-SCNC: 13 MMOL/L — SIGNIFICANT CHANGE UP (ref 5–17)
BUN SERPL-MCNC: 27 MG/DL — HIGH (ref 7–23)
CALCIUM SERPL-MCNC: 10 MG/DL — SIGNIFICANT CHANGE UP (ref 8.4–10.5)
CHLORIDE SERPL-SCNC: 96 MMOL/L — SIGNIFICANT CHANGE UP (ref 96–108)
CO2 SERPL-SCNC: 21 MMOL/L — LOW (ref 22–31)
CREAT SERPL-MCNC: 0.85 MG/DL — SIGNIFICANT CHANGE UP (ref 0.5–1.3)
EGFR: 69 ML/MIN/1.73M2 — SIGNIFICANT CHANGE UP
GLUCOSE SERPL-MCNC: 207 MG/DL — HIGH (ref 70–99)
HCT VFR BLD CALC: 36.1 % — SIGNIFICANT CHANGE UP (ref 34.5–45)
HGB BLD-MCNC: 11.7 G/DL — SIGNIFICANT CHANGE UP (ref 11.5–15.5)
MCHC RBC-ENTMCNC: 28.7 PG — SIGNIFICANT CHANGE UP (ref 27–34)
MCHC RBC-ENTMCNC: 32.4 G/DL — SIGNIFICANT CHANGE UP (ref 32–36)
MCV RBC AUTO: 88.7 FL — SIGNIFICANT CHANGE UP (ref 80–100)
NRBC # BLD: 0 /100 WBCS — SIGNIFICANT CHANGE UP (ref 0–0)
PLATELET # BLD AUTO: 442 K/UL — HIGH (ref 150–400)
POTASSIUM SERPL-MCNC: 5 MMOL/L — SIGNIFICANT CHANGE UP (ref 3.5–5.3)
POTASSIUM SERPL-SCNC: 5 MMOL/L — SIGNIFICANT CHANGE UP (ref 3.5–5.3)
RBC # BLD: 4.07 M/UL — SIGNIFICANT CHANGE UP (ref 3.8–5.2)
RBC # FLD: 12.1 % — SIGNIFICANT CHANGE UP (ref 10.3–14.5)
SODIUM SERPL-SCNC: 130 MMOL/L — LOW (ref 135–145)
WBC # BLD: 10.01 K/UL — SIGNIFICANT CHANGE UP (ref 3.8–10.5)
WBC # FLD AUTO: 10.01 K/UL — SIGNIFICANT CHANGE UP (ref 3.8–10.5)

## 2024-11-12 RX ORDER — METFORMIN HYDROCHLORIDE 500 MG/1
1 TABLET, EXTENDED RELEASE ORAL
Refills: 0 | DISCHARGE

## 2024-11-12 RX ORDER — ATENOLOL 100 MG
1 TABLET ORAL
Refills: 0 | DISCHARGE

## 2024-11-12 RX ORDER — LOSARTAN POTASSIUM AND HYDROCHLOROTHIAZIDE 50; 12.5 MG/1; MG/1
1 TABLET, FILM COATED ORAL
Refills: 0 | DISCHARGE

## 2024-11-12 RX ORDER — AMLODIPINE BESYLATE 10 MG
1 TABLET ORAL
Refills: 0 | DISCHARGE

## 2024-11-12 NOTE — H&P PST ADULT - PROBLEM SELECTOR PLAN 1
Pt. scheduled for Vaginectomy, Lefort Colpocleisis with Dr. Chau on 12/3/24.  Pre-op instructions given, all questions answered.  Surgical Soap given.  Labs: CBC, BMP, A1C

## 2024-11-12 NOTE — H&P PST ADULT - PROBLEM SELECTOR PLAN 3
Caprini Score Greater than or =7: High risk, pharmocologic VTE prophylaxis indicated for most patients (in the absence of contraindications)

## 2024-11-12 NOTE — H&P PST ADULT - ASSESSMENT
DASI Score:3.94  DASI Activity: pt able to feed self, walk around house with a walker, needs assistance with most ADL's.   Loose or removable teeth: denies   CAPRINI SCORE    AGE RELATED RISK FACTORS                                                             [ ] Age 41-60 years                                            (1 Point)  [ ] Age: 61-74 years                                           (2 Points)                 [x ] Age= 75 years                                                (3 Points)             DISEASE RELATED RISK FACTORS                                                       [x] Edema in the lower extremities                 (1 Point)                     [x ] Varicose veins                                               (1 Point)                                 [x BMI > 25 Kg/m2                                            (1 Point)                                  [ ] Serious infection (ie PNA)                            (1 Point)                     [ ] Lung disease ( COPD, Emphysema)            (1 Point)                                                                          [ ] Acute myocardial infarction                         (1 Point)                  [ ] Congestive heart failure (in the previous month)  (1 Point)         [ ] Inflammatory bowel disease                            (1 Point)                  [ ] Central venous access, PICC or Port               (2 points)       (within the last month)                                                                [ ] Stroke (in the previous month)                        (5 Points)    [ ] Previous or present malignancy                       (2 points)                                                                                                                                                         HEMATOLOGY RELATED FACTORS                                                         [ ] Prior episodes of VTE                                     (3 Points)                     [ ] Positive family history for VTE                      (3 Points)                  [ ] Prothrombin 28382 A                                     (3 Points)                     [ ] Factor V Leiden                                                (3 Points)                        [ ] Lupus anticoagulants                                      (3 Points)                                                           [ ] Anticardiolipin antibodies                              (3 Points)                                                       [ ] High homocysteine in the blood                   (3 Points)                                             [ ] Other congenital or acquired thrombophilia      (3 Points)                                                [ ] Heparin induced thrombocytopenia                  (3 Points)                                        MOBILITY RELATED FACTORS  [ ] Bed rest                                                         (1 Point)  [ ] Plaster cast                                                    (2 points)  [ ] Bed bound for more than 72 hours           (2 Points)    GENDER SPECIFIC FACTORS  [ ] Pregnancy or had a baby within the last month   (1 Point)  [ ] Post-partum < 6 weeks                                   (1 Point)  [ ] Hormonal therapy  or oral contraception   (1 Point)  [ ] History of pregnancy complications              (1 point)  [ ] Unexplained or recurrent              (1 Point)    OTHER RISK FACTORS                                           (1 Point)  [ ] BMI >40, smoking, diabetes requiring insulin, chemotherapy  blood transfusions and length of surgery over 2 hours    SURGERY RELATED RISK FACTORS  [ ]  Section within the last month     (1 Point)  [ ] Minor surgery                                                  (1 Point)  [ ] Arthroscopic surgery                                       (2 Points)  [ x] Planned major surgery lasting more            (2 Points)      than 45 minutes     [ ] Elective hip or knee joint replacement       (5 points)       surgery                                                TRAUMA RELATED RISK FACTORS  [ ] Fracture of the hip, pelvis, or leg                       (5 Points)  [ ] Spinal cord injury resulting in paralysis             (5 points)       (in the previous month)    [ ] Paralysis  (less than 1 month)                             (5 Points)  [ ] Multiple Trauma within 1 month                        (5 Points)    Total Score [  8 ]    Caprini Score 0-2: Low Risk, NO VTE prophylaxis required for most patients, encourage ambulation  Caprini Score 3-6: Moderate Risk , pharmacologic VTE prophylaxis is indicated for most patients (in the absence of contraindications)  Caprini Score Greater than or =7: High risk, pharmocologic VTE prophylaxis indicated for most patients (in the absence of contraindications)

## 2024-11-12 NOTE — H&P PST ADULT - HISTORY OF PRESENT ILLNESS
79 year old female Yakut speaking) with PMhx HTN, HLD, DM, Vaginal prolapse. C/o vaginal prolapse. Pt. reports she has been managing pessary since 2015; however, recently pessary has begun to fall out. She does endorse some vaginal discharge. Denies any vaginal bleeding, dysuria or hematuria. Denies any chest pain, palpitations, SOB, N/V, fever or chills. She now presents to PST with daughter in law prior to scheduled Vaginectomy, Lefort Colpocleisis with Dr. Chau on 12/3/24.

## 2024-11-13 LAB
A1C WITH ESTIMATED AVERAGE GLUCOSE RESULT: 8.4 % — HIGH (ref 4–5.6)
ESTIMATED AVERAGE GLUCOSE: 194 MG/DL — HIGH (ref 68–114)

## 2024-11-18 ENCOUNTER — NON-APPOINTMENT (OUTPATIENT)
Age: 79
End: 2024-11-18

## 2024-11-20 PROCEDURE — 76830 TRANSVAGINAL US NON-OB: CPT

## 2024-11-20 PROCEDURE — G0463: CPT

## 2024-11-20 PROCEDURE — 85027 COMPLETE CBC AUTOMATED: CPT

## 2024-11-20 PROCEDURE — 58100 BIOPSY OF UTERUS LINING: CPT

## 2024-11-20 PROCEDURE — 76856 US EXAM PELVIC COMPLETE: CPT

## 2024-11-20 PROCEDURE — 83036 HEMOGLOBIN GLYCOSYLATED A1C: CPT

## 2024-11-20 PROCEDURE — 80048 BASIC METABOLIC PNL TOTAL CA: CPT

## 2024-11-21 ENCOUNTER — APPOINTMENT (OUTPATIENT)
Dept: UROGYNECOLOGY | Facility: CLINIC | Age: 79
End: 2024-11-21

## 2024-12-03 ENCOUNTER — APPOINTMENT (OUTPATIENT)
Dept: UROGYNECOLOGY | Facility: HOSPITAL | Age: 79
End: 2024-12-03

## 2024-12-17 ENCOUNTER — APPOINTMENT (OUTPATIENT)
Dept: UROGYNECOLOGY | Facility: CLINIC | Age: 79
End: 2024-12-17

## 2025-01-22 NOTE — END OF VISIT
[FreeTextEntry3] : I have reviewed the above and agree with all pertinent clinical information including history and physical examination and agree with treatment plan.
Her/She